# Patient Record
Sex: MALE | Race: WHITE | NOT HISPANIC OR LATINO | Employment: FULL TIME | URBAN - METROPOLITAN AREA
[De-identification: names, ages, dates, MRNs, and addresses within clinical notes are randomized per-mention and may not be internally consistent; named-entity substitution may affect disease eponyms.]

---

## 2020-01-06 DIAGNOSIS — M25.552 PAIN OF LEFT HIP JOINT: Primary | ICD-10-CM

## 2020-01-07 ENCOUNTER — HOSPITAL ENCOUNTER (OUTPATIENT)
Dept: RADIOLOGY | Facility: HOSPITAL | Age: 60
Discharge: HOME | End: 2020-01-07
Attending: ORTHOPAEDIC SURGERY
Payer: COMMERCIAL

## 2020-01-07 ENCOUNTER — OFFICE VISIT (OUTPATIENT)
Dept: ORTHOPEDICS | Facility: CLINIC | Age: 60
End: 2020-01-07
Payer: COMMERCIAL

## 2020-01-07 VITALS
SYSTOLIC BLOOD PRESSURE: 142 MMHG | BODY MASS INDEX: 27.92 KG/M2 | WEIGHT: 195 LBS | HEIGHT: 70 IN | HEART RATE: 80 BPM | DIASTOLIC BLOOD PRESSURE: 88 MMHG

## 2020-01-07 DIAGNOSIS — M25.552 PAIN OF LEFT HIP JOINT: ICD-10-CM

## 2020-01-07 DIAGNOSIS — S76.012A STRAIN OF LEFT HIP, INITIAL ENCOUNTER: Primary | ICD-10-CM

## 2020-01-07 PROCEDURE — 3008F BODY MASS INDEX DOCD: CPT | Mod: CPTII,S$GLB,, | Performed by: ORTHOPAEDIC SURGERY

## 2020-01-07 PROCEDURE — 73502 X-RAY EXAM HIP UNI 2-3 VIEWS: CPT | Mod: TC,PN,LT

## 2020-01-07 PROCEDURE — 99203 OFFICE O/P NEW LOW 30 MIN: CPT | Mod: S$GLB,,, | Performed by: ORTHOPAEDIC SURGERY

## 2020-01-07 PROCEDURE — 99203 PR OFFICE/OUTPT VISIT, NEW, LEVL III, 30-44 MIN: ICD-10-PCS | Mod: S$GLB,,, | Performed by: ORTHOPAEDIC SURGERY

## 2020-01-07 PROCEDURE — 99999 PR PBB SHADOW E&M-EST. PATIENT-LVL III: ICD-10-PCS | Mod: PBBFAC,,, | Performed by: ORTHOPAEDIC SURGERY

## 2020-01-07 PROCEDURE — 99999 PR PBB SHADOW E&M-EST. PATIENT-LVL III: CPT | Mod: PBBFAC,,, | Performed by: ORTHOPAEDIC SURGERY

## 2020-01-07 PROCEDURE — 3008F PR BODY MASS INDEX (BMI) DOCUMENTED: ICD-10-PCS | Mod: CPTII,S$GLB,, | Performed by: ORTHOPAEDIC SURGERY

## 2020-01-07 PROCEDURE — 73502 X-RAY EXAM HIP UNI 2-3 VIEWS: CPT | Mod: 26,LT,, | Performed by: RADIOLOGY

## 2020-01-07 PROCEDURE — 73502 XR PELVIS 3 VIEW INC HIP 2 VIEW LEFT: ICD-10-PCS | Mod: 26,LT,, | Performed by: RADIOLOGY

## 2020-01-07 RX ORDER — EZETIMIBE AND SIMVASTATIN 10; 40 MG/1; MG/1
TABLET ORAL
COMMUNITY
Start: 2019-12-18

## 2020-01-07 NOTE — LETTER
January 7, 2020      [facility]  [address] [place] [address] [address]  [facility] [address]  Phone: [phone]       Patient: Bernardo Olsen   YOB: 1960  Date of Visit: 01/07/2020    To Whom It May Concern:    Tank Olsen  was at [facility] on 01/07/2020. Left hip strain Will keep the patient on crutches weight-bearing as tolerated and see him back on Friday. Will keep him out of work until we see him again on 01/10/2020. If you have any questions or concerns, or if I can be of further assistance, please do not hesitate to contact me.    Sincerely,        MD Katelin Chavez MA

## 2020-01-07 NOTE — PROGRESS NOTES
1/7/2020      History reviewed. No pertinent past medical history.    History reviewed. No pertinent surgical history.      Current Outpatient Medications:     ezetimibe-simvastatin 10-40 mg (VYTORIN) 10-40 mg per tablet, , Disp: , Rfl:     Allergies as of 01/07/2020    (No Known Allergies)       History reviewed. No pertinent family history.    Social History     Socioeconomic History    Marital status:      Spouse name: Not on file    Number of children: Not on file    Years of education: Not on file    Highest education level: Not on file   Occupational History    Not on file   Social Needs    Financial resource strain: Not on file    Food insecurity:     Worry: Not on file     Inability: Not on file    Transportation needs:     Medical: Not on file     Non-medical: Not on file   Tobacco Use    Smoking status: Never Smoker   Substance and Sexual Activity    Alcohol use: Not on file    Drug use: Not on file    Sexual activity: Not on file   Lifestyle    Physical activity:     Days per week: Not on file     Minutes per session: Not on file    Stress: Not on file   Relationships    Social connections:     Talks on phone: Not on file     Gets together: Not on file     Attends [employer] service: Not on file     Active member of club or organization: Not on file     Attends meetings of clubs or organizations: Not on file     Relationship status: Not on file   Other Topics Concern    Not on file   Social History Narrative    Not on file             HPI:  Patient slipped in his driveway injuring his left hip he complains of pain in the left hip area      Review of Systems   Constitution: Negative for chills and fever.   HENT: Negative for headaches and blurry vision.   Cardiovascular: Negative for chest pain, irregular heartbeat, leg swelling and palpitations.   Respiratory: Negative for cough and shortness of breath.   Endocrine: Negative for polyuria.   Hematologic/Lymphatic: Negative for  bleeding problem. Does not bruise/bleed easily.   Skin: Negative for poor wound healing and rash.   Musculoskeletal: Negative for joint pain. Negative for arthritis, joint swelling, muscle weakness and myalgias.   Gastrointestinal: Negative for abdominal pain, heartburn, melena, nausea and vomiting.   Genitourinary: Negative for bladder incontinence and dysuria.   Neurological: Negative for numbness.   Psychiatric/Behavioral: Negative for depression. The patient is not nervous/anxious.     PE:  [unfilled]    A&Ox3, NAD  Neck-supple with no pain  RUE no swelling or deformity  LUE no swelling or deformity  Pelvis patient has no pelvic pain examination though of the left hip he has good range of motion but he does complain of some mild groin discomfort  Back no back pain straight leg raise negative  RLE no swelling or deformity  LLE examination of the right hip patient has fairly good range of motion some discomfort in the groin a range of motion he is ambulating with crutches with almost full weight-bearing but he complains of some discomfort in his groin area    Xrays:  X-ray of the pelvis in the left hip shows no evidence of any fractures        Labs:    No results found for this or any previous visit (from the past 24 hour(s)).    Assessment/Plan:   Left hip strain no definite fracture seen we will keep the patient on crutches weight-bearing as tolerated and see him back on Friday will keep him out of work until we see him again on Friday

## 2020-01-10 ENCOUNTER — OFFICE VISIT (OUTPATIENT)
Dept: ORTHOPEDICS | Facility: CLINIC | Age: 60
End: 2020-01-10
Payer: COMMERCIAL

## 2020-01-10 VITALS — BODY MASS INDEX: 27.92 KG/M2 | WEIGHT: 195 LBS | HEIGHT: 70 IN

## 2020-01-10 DIAGNOSIS — S76.012A STRAIN OF LEFT HIP, INITIAL ENCOUNTER: Primary | ICD-10-CM

## 2020-01-10 DIAGNOSIS — M25.552 PAIN OF LEFT HIP JOINT: Primary | ICD-10-CM

## 2020-01-10 PROCEDURE — 3008F PR BODY MASS INDEX (BMI) DOCUMENTED: ICD-10-PCS | Mod: CPTII,S$GLB,, | Performed by: ORTHOPAEDIC SURGERY

## 2020-01-10 PROCEDURE — 99999 PR PBB SHADOW E&M-EST. PATIENT-LVL III: ICD-10-PCS | Mod: PBBFAC,,, | Performed by: ORTHOPAEDIC SURGERY

## 2020-01-10 PROCEDURE — 99999 PR PBB SHADOW E&M-EST. PATIENT-LVL III: CPT | Mod: PBBFAC,,, | Performed by: ORTHOPAEDIC SURGERY

## 2020-01-10 PROCEDURE — 3008F BODY MASS INDEX DOCD: CPT | Mod: CPTII,S$GLB,, | Performed by: ORTHOPAEDIC SURGERY

## 2020-01-10 PROCEDURE — 99213 PR OFFICE/OUTPT VISIT, EST, LEVL III, 20-29 MIN: ICD-10-PCS | Mod: S$GLB,,, | Performed by: ORTHOPAEDIC SURGERY

## 2020-01-10 PROCEDURE — 99213 OFFICE O/P EST LOW 20 MIN: CPT | Mod: S$GLB,,, | Performed by: ORTHOPAEDIC SURGERY

## 2020-01-10 NOTE — PROGRESS NOTES
1/10/2020      Past Medical History:   Diagnosis Date    Hyperlipidemia        Past Surgical History:   Procedure Laterality Date    FOOT SURGERY      HAND SURGERY      VASECTOMY           Current Outpatient Medications:     ezetimibe-simvastatin 10-40 mg (VYTORIN) 10-40 mg per tablet, , Disp: , Rfl:     Allergies as of 01/10/2020    (No Known Allergies)       History reviewed. No pertinent family history.    Social History     Socioeconomic History    Marital status:      Spouse name: Not on file    Number of children: Not on file    Years of education: Not on file    Highest education level: Not on file   Occupational History    Not on file   Social Needs    Financial resource strain: Not on file    Food insecurity:     Worry: Not on file     Inability: Not on file    Transportation needs:     Medical: Not on file     Non-medical: Not on file   Tobacco Use    Smoking status: Never Smoker    Smokeless tobacco: Never Used   Substance and Sexual Activity    Alcohol use: Yes     Frequency: Never     Comment: Occasional Beer    Drug use: Never    Sexual activity: Not on file   Lifestyle    Physical activity:     Days per week: Not on file     Minutes per session: Not on file    Stress: Not on file   Relationships    Social connections:     Talks on phone: Not on file     Gets together: Not on file     Attends Hindu[employer] service: Not on file     Active member of club or organization: Not on file     Attends meetings of clubs or organizations: Not on file     Relationship status: Not on file   Other Topics Concern    Not on file   Social History Narrative    Not on file             HPI:  Patient returns today for follow-up of his left hip injury patient continues to complain of significant pain in the pelvic area he is unable to ambulate without a crutch    Review of Systems   Constitution: Negative for chills and fever.   HENT: Negative for headaches and blurry vision.   Cardiovascular:  Negative for chest pain, irregular heartbeat, leg swelling and palpitations.   Respiratory: Negative for cough and shortness of breath.   Endocrine: Negative for polyuria.   Hematologic/Lymphatic: Negative for bleeding problem. Does not bruise/bleed easily.   Skin: Negative for poor wound healing and rash.   Musculoskeletal: Negative for joint pain. Negative for arthritis, joint swelling, muscle weakness and myalgias.   Gastrointestinal: Negative for abdominal pain, heartburn, melena, nausea and vomiting.   Genitourinary: Negative for bladder incontinence and dysuria.   Neurological: Negative for numbness.   Psychiatric/Behavioral: Negative for depression. The patient is not nervous/anxious.     PE:  [unfilled]    A&Ox3, NAD  Neck-supple with no pain  RUE no swelling or deformity  LUE no swelling or deformity  Pelvis patient has some generalized pain in the left hip area no palpable lesion most of his pain appears to be laterally and in the medial aspect of the groin area and in the proximal femur  Back no back pain straight leg raise negative  LLE patient is ambulating with a cane with a severe antalgic gait on the left hip he is unable to put his full weight on his left hip he appears to have good range of motion of left hip his pain appears to be somewhat improved from last visit but is still significant  RLE examination of the right leg he has good range of motion and no pain    Xrays:  No x-rays done today        Labs:    No results found for this or any previous visit (from the past 24 hour(s)).    Assessment/Plan:   Patient's last x-rays showed no evidence of any fracture of left hip he is now almost 2 weeks status post his injury I am going to go ahead and get an MRI to his pelvis to rule out any hip pathology of occult fracture the patient is unable to return to work at this time we will see him back in about 5 days with the MRI he is remain on his crutches

## 2020-01-16 ENCOUNTER — TELEPHONE (OUTPATIENT)
Dept: ORTHOPEDICS | Facility: CLINIC | Age: 60
End: 2020-01-16

## 2020-01-16 NOTE — TELEPHONE ENCOUNTER
----- Message from Peyton Pelletier MA sent at 1/16/2020 10:03 AM CST -----  Contact: pt   Wants call back  MRI tomorrow  Call back

## 2020-01-17 ENCOUNTER — HOSPITAL ENCOUNTER (OUTPATIENT)
Dept: RADIOLOGY | Facility: HOSPITAL | Age: 60
Discharge: HOME | End: 2020-01-17
Attending: ORTHOPAEDIC SURGERY
Payer: COMMERCIAL

## 2020-01-17 DIAGNOSIS — S76.012A STRAIN OF LEFT HIP, INITIAL ENCOUNTER: ICD-10-CM

## 2020-01-17 PROCEDURE — 72195 MRI PELVIS W/O DYE: CPT | Mod: TC

## 2020-01-17 PROCEDURE — 72195 MRI PELVIS W/O DYE: CPT | Mod: 26,,, | Performed by: RADIOLOGY

## 2020-01-17 PROCEDURE — 72195 MRI PELVIS WITHOUT CONTRAST: ICD-10-PCS | Mod: 26,,, | Performed by: RADIOLOGY

## 2020-01-21 ENCOUNTER — OFFICE VISIT (OUTPATIENT)
Dept: ORTHOPEDICS | Facility: CLINIC | Age: 60
End: 2020-01-21
Payer: COMMERCIAL

## 2020-01-21 VITALS
BODY MASS INDEX: 27.92 KG/M2 | DIASTOLIC BLOOD PRESSURE: 90 MMHG | SYSTOLIC BLOOD PRESSURE: 139 MMHG | HEIGHT: 70 IN | HEART RATE: 68 BPM | WEIGHT: 195 LBS

## 2020-01-21 DIAGNOSIS — S32.485D CLOSED NONDISPLACED DOME FRACTURE OF LEFT ACETABULUM WITH ROUTINE HEALING, SUBSEQUENT ENCOUNTER: Primary | ICD-10-CM

## 2020-01-21 PROCEDURE — 99213 OFFICE O/P EST LOW 20 MIN: CPT | Mod: S$GLB,,, | Performed by: ORTHOPAEDIC SURGERY

## 2020-01-21 PROCEDURE — 3008F BODY MASS INDEX DOCD: CPT | Mod: CPTII,S$GLB,, | Performed by: ORTHOPAEDIC SURGERY

## 2020-01-21 PROCEDURE — 3008F PR BODY MASS INDEX (BMI) DOCUMENTED: ICD-10-PCS | Mod: CPTII,S$GLB,, | Performed by: ORTHOPAEDIC SURGERY

## 2020-01-21 PROCEDURE — 99999 PR PBB SHADOW E&M-EST. PATIENT-LVL III: ICD-10-PCS | Mod: PBBFAC,,, | Performed by: ORTHOPAEDIC SURGERY

## 2020-01-21 PROCEDURE — 99213 PR OFFICE/OUTPT VISIT, EST, LEVL III, 20-29 MIN: ICD-10-PCS | Mod: S$GLB,,, | Performed by: ORTHOPAEDIC SURGERY

## 2020-01-21 PROCEDURE — 99999 PR PBB SHADOW E&M-EST. PATIENT-LVL III: CPT | Mod: PBBFAC,,, | Performed by: ORTHOPAEDIC SURGERY

## 2020-01-21 NOTE — PROGRESS NOTES
1/21/2020      Past Medical History:   Diagnosis Date    Hyperlipidemia        Past Surgical History:   Procedure Laterality Date    FOOT SURGERY      HAND SURGERY      VASECTOMY           Current Outpatient Medications:     ezetimibe-simvastatin 10-40 mg (VYTORIN) 10-40 mg per tablet, , Disp: , Rfl:     Allergies as of 01/21/2020 - Reviewed 01/21/2020   Allergen Reaction Noted    Pcn [penicillins] Rash 01/21/2020       History reviewed. No pertinent family history.    Social History     Socioeconomic History    Marital status:      Spouse name: Not on file    Number of children: Not on file    Years of education: Not on file    Highest education level: Not on file   Occupational History    Not on file   Social Needs    Financial resource strain: Not on file    Food insecurity:     Worry: Not on file     Inability: Not on file    Transportation needs:     Medical: Not on file     Non-medical: Not on file   Tobacco Use    Smoking status: Never Smoker    Smokeless tobacco: Never Used   Substance and Sexual Activity    Alcohol use: Yes     Frequency: Never     Comment: Occasional Beer    Drug use: Never    Sexual activity: Not on file   Lifestyle    Physical activity:     Days per week: Not on file     Minutes per session: Not on file    Stress: Not on file   Relationships    Social connections:     Talks on phone: Not on file     Gets together: Not on file     Attends [employer] service: Not on file     Active member of club or organization: Not on file     Attends meetings of clubs or organizations: Not on file     Relationship status: Not on file   Other Topics Concern    Not on file   Social History Narrative    Not on file             HPI:  Patient is now about 4 weeks status post injury to his left pelvis we went ahead and did an MRI scan as he was continuing to have pain in the left pelvic area the patient's pain level is decreasing he says it is about 2/10 usually at the end of the  day he is ambulating with crutches partial weight-bearing on the on the left leg      Review of Systems   Constitution: Negative for chills and fever.   HENT: Negative for headaches and blurry vision.   Cardiovascular: Negative for chest pain, irregular heartbeat, leg swelling and palpitations.   Respiratory: Negative for cough and shortness of breath.   Endocrine: Negative for polyuria.   Hematologic/Lymphatic: Negative for bleeding problem. Does not bruise/bleed easily.   Skin: Negative for poor wound healing and rash.   Musculoskeletal: Negative for joint pain. Negative for arthritis, joint swelling, muscle weakness and myalgias.   Gastrointestinal: Negative for abdominal pain, heartburn, melena, nausea and vomiting.   Genitourinary: Negative for bladder incontinence and dysuria.   Neurological: Negative for numbness.   Psychiatric/Behavioral: Negative for depression. The patient is not nervous/anxious.     PE:  [unfilled]    A&Ox3, NAD  Neck-supple with no pain  RUE no swelling or deformity  LUE no swelling or deformity  Pelvis on examination of his left hip he has got full range of motion of the hip with no pain there is no shortening or deformity  Back patient has no back pain straight leg raise is negative  RLE no swelling or edema  LLE no swelling or edema    Xrays:  The patient's MRI shows evidence of a bony contusion in the left acetabulum there is a small nondisplaced hairline fracture in the roof of the acetabulum        Labs:    No results found for this or any previous visit (from the past 24 hour(s)).    Assessment/Plan:   Left hairline acetabular fracture nondisplaced patient is ambulating with no pain today the patient can return to work patient can return to work on January 27, 2020 with no restrictions will see him back in 4 weeks for a final x-ray and examination

## 2020-01-21 NOTE — LETTER
January 21, 2020      [facility]  [place] [address]  Phone: [phone]       Patient: Bernardo Olsen   YOB: 1960  Date of Visit: 01/21/2020    To Whom It May Concern:    Tank Olsen  was at [facility] on 01/21/2020. He may return to work/school on 01/27/2020 with no restrictions. If you have any questions or concerns, or if I can be of further assistance, please do not hesitate to contact me.    Sincerely,        MD Katelin Segura MA

## 2020-02-10 DIAGNOSIS — S32.485D CLOSED NONDISPLACED DOME FRACTURE OF LEFT ACETABULUM WITH ROUTINE HEALING, SUBSEQUENT ENCOUNTER: Primary | ICD-10-CM

## 2020-02-18 ENCOUNTER — OFFICE VISIT (OUTPATIENT)
Dept: ORTHOPEDICS | Facility: CLINIC | Age: 60
End: 2020-02-18
Payer: COMMERCIAL

## 2020-02-18 ENCOUNTER — HOSPITAL ENCOUNTER (OUTPATIENT)
Dept: RADIOLOGY | Facility: HOSPITAL | Age: 60
Discharge: HOME | End: 2020-02-18
Attending: ORTHOPAEDIC SURGERY
Payer: COMMERCIAL

## 2020-02-18 VITALS
DIASTOLIC BLOOD PRESSURE: 91 MMHG | SYSTOLIC BLOOD PRESSURE: 143 MMHG | HEART RATE: 63 BPM | WEIGHT: 195 LBS | BODY MASS INDEX: 27.92 KG/M2 | HEIGHT: 70 IN

## 2020-02-18 DIAGNOSIS — S32.485D CLOSED NONDISPLACED DOME FRACTURE OF LEFT ACETABULUM WITH ROUTINE HEALING, SUBSEQUENT ENCOUNTER: ICD-10-CM

## 2020-02-18 DIAGNOSIS — S32.485D CLOSED NONDISPLACED DOME FRACTURE OF LEFT ACETABULUM WITH ROUTINE HEALING, SUBSEQUENT ENCOUNTER: Primary | ICD-10-CM

## 2020-02-18 PROCEDURE — 3008F PR BODY MASS INDEX (BMI) DOCUMENTED: ICD-10-PCS | Mod: CPTII,S$GLB,, | Performed by: ORTHOPAEDIC SURGERY

## 2020-02-18 PROCEDURE — 73502 XR HIP 2 VIEW LEFT: ICD-10-PCS | Mod: 26,LT,, | Performed by: RADIOLOGY

## 2020-02-18 PROCEDURE — 99213 OFFICE O/P EST LOW 20 MIN: CPT | Mod: S$GLB,,, | Performed by: ORTHOPAEDIC SURGERY

## 2020-02-18 PROCEDURE — 99999 PR PBB SHADOW E&M-EST. PATIENT-LVL III: CPT | Mod: PBBFAC,,, | Performed by: ORTHOPAEDIC SURGERY

## 2020-02-18 PROCEDURE — 3008F BODY MASS INDEX DOCD: CPT | Mod: CPTII,S$GLB,, | Performed by: ORTHOPAEDIC SURGERY

## 2020-02-18 PROCEDURE — 99999 PR PBB SHADOW E&M-EST. PATIENT-LVL III: ICD-10-PCS | Mod: PBBFAC,,, | Performed by: ORTHOPAEDIC SURGERY

## 2020-02-18 PROCEDURE — 73502 X-RAY EXAM HIP UNI 2-3 VIEWS: CPT | Mod: TC,PN,LT

## 2020-02-18 PROCEDURE — 99213 PR OFFICE/OUTPT VISIT, EST, LEVL III, 20-29 MIN: ICD-10-PCS | Mod: S$GLB,,, | Performed by: ORTHOPAEDIC SURGERY

## 2020-02-18 PROCEDURE — 73502 X-RAY EXAM HIP UNI 2-3 VIEWS: CPT | Mod: 26,LT,, | Performed by: RADIOLOGY

## 2020-02-18 NOTE — PROGRESS NOTES
2/18/2020      Past Medical History:   Diagnosis Date    Hyperlipidemia        Past Surgical History:   Procedure Laterality Date    FOOT SURGERY      HAND SURGERY      VASECTOMY           Current Outpatient Medications:     ezetimibe-simvastatin 10-40 mg (VYTORIN) 10-40 mg per tablet, , Disp: , Rfl:     Allergies as of 02/18/2020 - Reviewed 02/18/2020   Allergen Reaction Noted    Pcn [penicillins] Rash 01/21/2020       History reviewed. No pertinent family history.    Social History     Socioeconomic History    Marital status:      Spouse name: Not on file    Number of children: Not on file    Years of education: Not on file    Highest education level: Not on file   Occupational History    Not on file   Social Needs    Financial resource strain: Not on file    Food insecurity:     Worry: Not on file     Inability: Not on file    Transportation needs:     Medical: Not on file     Non-medical: Not on file   Tobacco Use    Smoking status: Never Smoker    Smokeless tobacco: Never Used   Substance and Sexual Activity    Alcohol use: Yes     Frequency: Never     Comment: Occasional Beer    Drug use: Never    Sexual activity: Not on file   Lifestyle    Physical activity:     Days per week: Not on file     Minutes per session: Not on file    Stress: Not on file   Relationships    Social connections:     Talks on phone: Not on file     Gets together: Not on file     Attends Hindu service: Not on file     Active member of club or organization: Not on file     Attends meetings of clubs or organizations: Not on file     Relationship status: Not on file   Other Topics Concern    Not on file   Social History Narrative    Not on file             HPI:  Patient is here for follow-up of his left acetabular fracture he had a nondisplaced acetabular fracture which is being treated conservatively patient has minimal complaints      Review of Systems   Constitution: Negative for chills and fever.    HENT: Negative for headaches and blurry vision.   Cardiovascular: Negative for chest pain, irregular heartbeat, leg swelling and palpitations.   Respiratory: Negative for cough and shortness of breath.   Endocrine: Negative for polyuria.   Hematologic/Lymphatic: Negative for bleeding problem. Does not bruise/bleed easily.   Skin: Negative for poor wound healing and rash.   Musculoskeletal: Negative for joint pain. Negative for arthritis, joint swelling, muscle weakness and myalgias.   Gastrointestinal: Negative for abdominal pain, heartburn, melena, nausea and vomiting.   Genitourinary: Negative for bladder incontinence and dysuria.   Neurological: Negative for numbness.   Psychiatric/Behavioral: Negative for depression. The patient is not nervous/anxious.     PE:  [unfilled]    A&Ox3, NAD  Neck-supple with no pain  RUE no swelling or for  LUE no swelling or deformity  Pelvis patient is ambulating without a limp and has no groin pain  Back no back pain straight leg raise negative  RLE no swelling or deformity  LLE full range of motion of left hip no pain today    Xrays:  X-ray of the pelvis in the left hip shows no evidence of any acute fractures acetabulum appears to be intact        Labs:    No results found for this or any previous visit (from the past 24 hour(s)).    Assessment/Plan:   Left acetabular fracture healed in good position patient can resume regular work activities without restrictions return as needed